# Patient Record
Sex: FEMALE | Race: WHITE | NOT HISPANIC OR LATINO
[De-identification: names, ages, dates, MRNs, and addresses within clinical notes are randomized per-mention and may not be internally consistent; named-entity substitution may affect disease eponyms.]

---

## 2019-08-13 ENCOUNTER — APPOINTMENT (OUTPATIENT)
Dept: ORTHOPEDIC SURGERY | Facility: CLINIC | Age: 44
End: 2019-08-13

## 2020-05-22 ENCOUNTER — INPATIENT (INPATIENT)
Facility: HOSPITAL | Age: 45
LOS: 1 days | Discharge: HOME | DRG: 897 | End: 2020-05-24
Attending: INTERNAL MEDICINE | Admitting: INTERNAL MEDICINE
Payer: COMMERCIAL

## 2020-05-22 VITALS
HEART RATE: 101 BPM | RESPIRATION RATE: 16 BRPM | DIASTOLIC BLOOD PRESSURE: 100 MMHG | WEIGHT: 139.99 LBS | HEIGHT: 64 IN | TEMPERATURE: 98 F | OXYGEN SATURATION: 98 % | SYSTOLIC BLOOD PRESSURE: 155 MMHG

## 2020-05-22 DIAGNOSIS — R55 SYNCOPE AND COLLAPSE: ICD-10-CM

## 2020-05-22 LAB
ALBUMIN SERPL ELPH-MCNC: 4.4 G/DL — SIGNIFICANT CHANGE UP (ref 3.3–5)
ALP SERPL-CCNC: 62 U/L — SIGNIFICANT CHANGE UP (ref 40–120)
ALT FLD-CCNC: 54 U/L — HIGH (ref 10–45)
AMPHET UR-MCNC: NEGATIVE — SIGNIFICANT CHANGE UP
ANION GAP SERPL CALC-SCNC: 20 MMOL/L — HIGH (ref 5–17)
APAP SERPL-MCNC: <15 UG/ML — SIGNIFICANT CHANGE UP (ref 10–30)
APPEARANCE UR: CLEAR — SIGNIFICANT CHANGE UP
AST SERPL-CCNC: 92 U/L — HIGH (ref 10–40)
BACTERIA # UR AUTO: ABNORMAL
BARBITURATES UR SCN-MCNC: NEGATIVE — SIGNIFICANT CHANGE UP
BASOPHILS # BLD AUTO: 0.07 K/UL — SIGNIFICANT CHANGE UP (ref 0–0.2)
BASOPHILS NFR BLD AUTO: 0.8 % — SIGNIFICANT CHANGE UP (ref 0–2)
BENZODIAZ UR-MCNC: NEGATIVE — SIGNIFICANT CHANGE UP
BILIRUB SERPL-MCNC: 1.6 MG/DL — HIGH (ref 0.2–1.2)
BILIRUB UR-MCNC: NEGATIVE — SIGNIFICANT CHANGE UP
BUN SERPL-MCNC: 10 MG/DL — SIGNIFICANT CHANGE UP (ref 7–23)
CALCIUM SERPL-MCNC: 9.1 MG/DL — SIGNIFICANT CHANGE UP (ref 8.4–10.5)
CHLORIDE SERPL-SCNC: 86 MMOL/L — LOW (ref 96–108)
CO2 SERPL-SCNC: 24 MMOL/L — SIGNIFICANT CHANGE UP (ref 22–31)
COCAINE METAB.OTHER UR-MCNC: NEGATIVE — SIGNIFICANT CHANGE UP
COLOR SPEC: YELLOW — SIGNIFICANT CHANGE UP
CREAT SERPL-MCNC: 0.56 MG/DL — SIGNIFICANT CHANGE UP (ref 0.5–1.3)
DIFF PNL FLD: ABNORMAL
EOSINOPHIL # BLD AUTO: 0.01 K/UL — SIGNIFICANT CHANGE UP (ref 0–0.5)
EOSINOPHIL NFR BLD AUTO: 0.1 % — SIGNIFICANT CHANGE UP (ref 0–6)
EPI CELLS # UR: 4 /HPF — SIGNIFICANT CHANGE UP
ETHANOL SERPL-MCNC: SIGNIFICANT CHANGE UP MG/DL (ref 0–10)
GLUCOSE SERPL-MCNC: 188 MG/DL — HIGH (ref 70–99)
GLUCOSE UR QL: NEGATIVE — SIGNIFICANT CHANGE UP
HCT VFR BLD CALC: 33 % — LOW (ref 34.5–45)
HGB BLD-MCNC: 11.2 G/DL — LOW (ref 11.5–15.5)
HYALINE CASTS # UR AUTO: 1 /LPF — SIGNIFICANT CHANGE UP (ref 0–2)
IMM GRANULOCYTES NFR BLD AUTO: 0.7 % — SIGNIFICANT CHANGE UP (ref 0–1.5)
KETONES UR-MCNC: SIGNIFICANT CHANGE UP
LEUKOCYTE ESTERASE UR-ACNC: ABNORMAL
LYMPHOCYTES # BLD AUTO: 1.02 K/UL — SIGNIFICANT CHANGE UP (ref 1–3.3)
LYMPHOCYTES # BLD AUTO: 11.2 % — LOW (ref 13–44)
MAGNESIUM SERPL-MCNC: 1 MG/DL — CRITICAL LOW (ref 1.6–2.6)
MCHC RBC-ENTMCNC: 31.8 PG — SIGNIFICANT CHANGE UP (ref 27–34)
MCHC RBC-ENTMCNC: 33.9 GM/DL — SIGNIFICANT CHANGE UP (ref 32–36)
MCV RBC AUTO: 93.8 FL — SIGNIFICANT CHANGE UP (ref 80–100)
METHADONE UR-MCNC: NEGATIVE — SIGNIFICANT CHANGE UP
MONOCYTES # BLD AUTO: 0.47 K/UL — SIGNIFICANT CHANGE UP (ref 0–0.9)
MONOCYTES NFR BLD AUTO: 5.2 % — SIGNIFICANT CHANGE UP (ref 2–14)
NEUTROPHILS # BLD AUTO: 7.47 K/UL — HIGH (ref 1.8–7.4)
NEUTROPHILS NFR BLD AUTO: 82 % — HIGH (ref 43–77)
NITRITE UR-MCNC: NEGATIVE — SIGNIFICANT CHANGE UP
NRBC # BLD: 0 /100 WBCS — SIGNIFICANT CHANGE UP (ref 0–0)
OPIATES UR-MCNC: NEGATIVE — SIGNIFICANT CHANGE UP
OXYCODONE UR-MCNC: NEGATIVE — SIGNIFICANT CHANGE UP
PCP SPEC-MCNC: SIGNIFICANT CHANGE UP
PCP UR-MCNC: NEGATIVE — SIGNIFICANT CHANGE UP
PH UR: 8 — SIGNIFICANT CHANGE UP (ref 5–8)
PHOSPHATE SERPL-MCNC: 0.9 MG/DL — CRITICAL LOW (ref 2.5–4.5)
PLATELET # BLD AUTO: 180 K/UL — SIGNIFICANT CHANGE UP (ref 150–400)
POTASSIUM SERPL-MCNC: 3.6 MMOL/L — SIGNIFICANT CHANGE UP (ref 3.5–5.3)
POTASSIUM SERPL-SCNC: 3.6 MMOL/L — SIGNIFICANT CHANGE UP (ref 3.5–5.3)
PROT SERPL-MCNC: 7.6 G/DL — SIGNIFICANT CHANGE UP (ref 6–8.3)
PROT UR-MCNC: NEGATIVE — SIGNIFICANT CHANGE UP
RBC # BLD: 3.52 M/UL — LOW (ref 3.8–5.2)
RBC # FLD: 15.8 % — HIGH (ref 10.3–14.5)
RBC CASTS # UR COMP ASSIST: 2 /HPF — SIGNIFICANT CHANGE UP (ref 0–4)
SALICYLATES SERPL-MCNC: <2 MG/DL — LOW (ref 15–30)
SARS-COV-2 RNA SPEC QL NAA+PROBE: SIGNIFICANT CHANGE UP
SODIUM SERPL-SCNC: 130 MMOL/L — LOW (ref 135–145)
SP GR SPEC: 1.01 — LOW (ref 1.01–1.02)
THC UR QL: NEGATIVE — SIGNIFICANT CHANGE UP
UROBILINOGEN FLD QL: ABNORMAL
WBC # BLD: 9.1 K/UL — SIGNIFICANT CHANGE UP (ref 3.8–10.5)
WBC # FLD AUTO: 9.1 K/UL — SIGNIFICANT CHANGE UP (ref 3.8–10.5)
WBC UR QL: 3 /HPF — SIGNIFICANT CHANGE UP (ref 0–5)

## 2020-05-22 PROCEDURE — 70450 CT HEAD/BRAIN W/O DYE: CPT | Mod: 26

## 2020-05-22 PROCEDURE — 99285 EMERGENCY DEPT VISIT HI MDM: CPT

## 2020-05-22 RX ORDER — SODIUM CHLORIDE 9 MG/ML
1000 INJECTION INTRAMUSCULAR; INTRAVENOUS; SUBCUTANEOUS ONCE
Refills: 0 | Status: COMPLETED | OUTPATIENT
Start: 2020-05-22 | End: 2020-05-22

## 2020-05-22 RX ORDER — PANTOPRAZOLE SODIUM 20 MG/1
40 TABLET, DELAYED RELEASE ORAL
Refills: 0 | Status: DISCONTINUED | OUTPATIENT
Start: 2020-05-22 | End: 2020-05-24

## 2020-05-22 RX ORDER — SODIUM CHLORIDE 9 MG/ML
1000 INJECTION INTRAMUSCULAR; INTRAVENOUS; SUBCUTANEOUS
Refills: 0 | Status: DISCONTINUED | OUTPATIENT
Start: 2020-05-22 | End: 2020-05-24

## 2020-05-22 RX ORDER — FOLIC ACID 0.8 MG
1 TABLET ORAL ONCE
Refills: 0 | Status: COMPLETED | OUTPATIENT
Start: 2020-05-22 | End: 2020-05-22

## 2020-05-22 RX ORDER — MAGNESIUM SULFATE 500 MG/ML
1 VIAL (ML) INJECTION ONCE
Refills: 0 | Status: COMPLETED | OUTPATIENT
Start: 2020-05-22 | End: 2020-05-22

## 2020-05-22 RX ORDER — THIAMINE MONONITRATE (VIT B1) 100 MG
100 TABLET ORAL DAILY
Refills: 0 | Status: DISCONTINUED | OUTPATIENT
Start: 2020-05-22 | End: 2020-05-24

## 2020-05-22 RX ORDER — THIAMINE MONONITRATE (VIT B1) 100 MG
100 TABLET ORAL ONCE
Refills: 0 | Status: COMPLETED | OUTPATIENT
Start: 2020-05-22 | End: 2020-05-22

## 2020-05-22 RX ORDER — FOLIC ACID 0.8 MG
1 TABLET ORAL DAILY
Refills: 0 | Status: DISCONTINUED | OUTPATIENT
Start: 2020-05-22 | End: 2020-05-24

## 2020-05-22 RX ADMIN — SODIUM CHLORIDE 1000 MILLILITER(S): 9 INJECTION INTRAMUSCULAR; INTRAVENOUS; SUBCUTANEOUS at 15:15

## 2020-05-22 RX ADMIN — SODIUM CHLORIDE 1000 MILLILITER(S): 9 INJECTION INTRAMUSCULAR; INTRAVENOUS; SUBCUTANEOUS at 18:56

## 2020-05-22 RX ADMIN — Medication 83.33 MILLIMOLE(S): at 17:16

## 2020-05-22 RX ADMIN — Medication 1 GRAM(S): at 17:11

## 2020-05-22 RX ADMIN — Medication 1 TABLET(S): at 14:49

## 2020-05-22 RX ADMIN — Medication 100 GRAM(S): at 15:15

## 2020-05-22 RX ADMIN — Medication 25 MILLIGRAM(S): at 14:49

## 2020-05-22 RX ADMIN — Medication 100 MILLIGRAM(S): at 14:49

## 2020-05-22 RX ADMIN — Medication 1 MILLIGRAM(S): at 17:16

## 2020-05-22 NOTE — ED ADULT NURSE NOTE - CHPI ED NUR SYMPTOMS NEG
no nausea/no vomiting/no dizziness/no chills/no decreased eating/drinking/no fever/no tingling/no pain/no weakness

## 2020-05-22 NOTE — ED ADULT NURSE NOTE - OBJECTIVE STATEMENT
43 yo F w/ PMHx of anxiety, depression, EtOH abuse, HTN presents to ED via EMS c/o syncopal episode. Per EMS they were called to pt residence after mother witnessed pt fall, hitting head, while foaming at the mouth. EMS reports residence was "in disarray with empty alcohol containers all over". Pt mother reportedly also expressed concern to EMS re: pt alcohol intake. Pt initially denied recent alcohol consumption, states was recently in rehab 2/2020, when questioned further pt admits to drinking recently, states last drink was several days ago. Pt states has not had a drink in several days d/t desire to "get better". 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 Pt does not remember events of fall, +LOC, when she woke EMS was present. Pt denies HA, changes in vision, n/v, weakness, dizziness. Extremities strong and equal b/l. PERRL. No obvious signs of physical injury. NSR noted on cardiac monitor. Pt denies any CP, SOB, N/V, fever, chills, urinary complaints, constipation, diarrhea, HA, dizziness, weakness. Pt A&Ox4, lungs CTA, +central pulses. Abdomen soft, not tender, not distended. Ambulating w/ steady gait, safety and comfort maintained, no acute distress noted at this time.

## 2020-05-22 NOTE — H&P ADULT - NSHPPHYSICALEXAM_GEN_ALL_CORE
pt. seen and examined, NAD     Vital Signs Last 24 Hrs  T(C): 36.7 (23 May 2020 04:23), Max: 37.2 (22 May 2020 19:51)  T(F): 98.1 (23 May 2020 04:23), Max: 99 (22 May 2020 20:49)  HR: 80 (23 May 2020 04:23) (78 - 101)  BP: 124/75 (23 May 2020 04:23) (118/80 - 155/100)  BP(mean): 108 (22 May 2020 19:51) (108 - 108)  RR: 18 (23 May 2020 04:23) (16 - 20)  SpO2: 98% (23 May 2020 04:23) (97% - 100%)    heent: nc/at  neck: supple, no JVD  lungs: B/L clear, no w/r/r  heart: s1s2 nml  abd: soft, NABS, NT/ND  ext: no e/c/c, pulses 2+  neuro: aaox3, no focal deficit

## 2020-05-22 NOTE — CONSULT NOTE ADULT - ASSESSMENT
Serina Artis  44F pmhx ETOH dependence s/p seizure like episode found to have small L frontal hypodensity, neurologically intact.  - No acute intervention  - MRI brain wwo  - Keppra 500 mg BID

## 2020-05-22 NOTE — H&P ADULT - PROBLEM SELECTOR PLAN 2
pt. was in rehab back in Feb '2020  - consult  -pt. says she already contacted rehab and likely will check in rehab again after d/c  -CIWA protocol  -IV ativan prn   Thiamine/ folic acid

## 2020-05-22 NOTE — ED PROVIDER NOTE - ATTENDING CONTRIBUTION TO CARE
Attending MD Rdz:  I personally have seen and examined this patient.  Resident note reviewed and agree on plan of care and except where noted.

## 2020-05-22 NOTE — ED ADULT NURSE NOTE - NSIMPLEMENTINTERV_GEN_ALL_ED
Implemented All Fall Risk Interventions:  [place] to call system. Call bell, personal items and telephone within reach. Instruct patient to call for assistance. Room bathroom lighting operational. Non-slip footwear when patient is off stretcher. Physically safe environment: no spills, clutter or unnecessary equipment. Stretcher in lowest position, wheels locked, appropriate side rails in place. Provide visual cue, wrist band, yellow gown, etc. Monitor gait and stability. Monitor for mental status changes and reorient to person, place, and time. Review medications for side effects contributing to fall risk. Reinforce activity limits and safety measures with patient and family.

## 2020-05-22 NOTE — H&P ADULT - PROBLEM SELECTOR PLAN 1
likely from ETOH abuse   -CT head shows frontal lobe hypodensity  -seen by neurosurgery   -advised to start keppra 500 mg bid   -MRI of brain

## 2020-05-22 NOTE — ED PROVIDER NOTE - PROGRESS NOTE DETAILS
Mojgan Silva MD: Low Mg and Phos, will replete. CT head shows L frontal abn suspicious for mass. Pt scheduled for MRI head tomorrow AM. Will admit to medicine.

## 2020-05-22 NOTE — ED PROVIDER NOTE - PHYSICAL EXAMINATION
CONSTITUTIONAL: Nontoxic, well nourished, well developed, middle-aged female, resting comfortably in no acute distress  HEAD: Normocephalic; atraumatic  EYES: Normal inspection, EOMI, 5mm L pupil and 3mm R pupil reactive to light  ENMT: External appears normal; normal oropharynx, tongue fasciculations   NECK: Supple; non-tender; no cervical lymphadenopathy  CARD: RRR; no audible murmurs, rubs, or gallops  RESP: No respiratory distress, lungs ctab/l  ABD: Soft, non-distended; non-tender; no rebound or guarding  EXT: No LE pitting edema or calf tenderness; distal pulses intact with good capillary refill, b/l hand tremors   SKIN: Warm, dry, intact  NEURO: aaox3, CN II-IX intact, 5/5 strength b/l UE and LE, sensation intact in all extremities, no pronator drift, finger to nose intact, no disdiadokinesia

## 2020-05-22 NOTE — ED ADULT NURSE NOTE - CAS EDN DISCHARGE ASSESSMENT
Patient baseline mental status/CIWA completed prior to report/Alert and oriented to person, place and time Alert and oriented to person, place and time/Patient baseline mental status/CIWA completed prior to report. RN informed that at 2020 Called NP team regarding CIWA order as order set placed incorrectly (no PRN meds, high risk/low risk). NP stated that order set will be fixed within the hour. Will continue to do CIWA assessments every 1 hour and will report back to NP if any medications are needed urgently.

## 2020-05-22 NOTE — H&P ADULT - NSHPLABSRESULTS_GEN_ALL_CORE
11.2   9.10  )-----------( 180      ( 22 May 2020 14:18 )             33.0     05-22    130<L>  |  86<L>  |  10  ----------------------------<  188<H>  3.6   |  24  |  0.56    Ca    9.1      22 May 2020 14:18  Phos  0.9     05-22  Mg     1.0     05-22    TPro  7.6  /  Alb  4.4  /  TBili  1.6<H>  /  DBili  x   /  AST  92<H>  /  ALT  54<H>  /  AlkPhos  62  05-22

## 2020-05-22 NOTE — ED PROVIDER NOTE - CLINICAL SUMMARY MEDICAL DECISION MAKING FREE TEXT BOX
Mojgan Silva MD: 43yo F with PMH of depression, anxiety, EtOH use disorder (6 weeks of rehab in February) who presents s/p fall with suspected substance use. No s/s of seizures, but tremulous on exam concerning for EtOH withdrawal vs anxiety. Will get labs, tox, CT head to r/o intracranial bleed Mojgan Silva MD: 43yo F with PMH of depression, anxiety, EtOH use disorder (6 weeks of rehab in February) who presents s/p fall with suspected substance use. No s/s of seizures, but tremulous on exam concerning for EtOH withdrawal vs anxiety. Will get labs, tox, CT head to r/o intracranial bleed    Attending MD Rdz: 43 yo female with PMH for HTN, anxiety/depression, alcohol abuse/misuse, presents with EMS after a witnessed syncopal episode where she fell, hit her head and was foaming at the mouth.  Patient states last alcohol use was 3 days ago.  Denies hx of withdrawal seizures.  Last in rehab in February 2020 per patient.  Exam: A & O x 3, NAD, HEENT WNL and no facial asymmetry; lungs CTAB, heart with reg rhythm without murmur; abdomen soft NTND; extremities with no edema; skin with no rashes, neuro exam non focal with no motor or sensory deficits. DDX syncope vs seizure due to alcohol withdrawal.  Plan: head CT, labs, check mag and phos, give MVI and folate, librium, check head CT.  Likely admission.

## 2020-05-22 NOTE — ED ADULT NURSE REASSESSMENT NOTE - NS ED NURSE REASSESS COMMENT FT1
Report received from AURELIO Puentes. Pt is resting comfortably in bed. Pt A&Ox4. Pt informed of POC and awaiting a bed upstairs. Pt has no complaints at this time. Pt safety maintained.

## 2020-05-22 NOTE — H&P ADULT - HISTORY OF PRESENT ILLNESS
45yo F with PMH of depression, anxiety, EtOH use disorder (6 weeks of rehab in February) who presents s/p fall. As per EMS, pt's mother had not heard from pt for a while and decided to visit her house today. Found house to be in disarray with many empty cans of alcoholic beverage. Pt had witnessed syncope and hit her head on wall and was foaming at the mouth. She states that she had nothing to eat today and was feeling weak. Pt has hx of vasovagal syncope, FHx of vasovagal syncope. Started drinking EtOH after rehab and stopped 3-4 days prior. No seizure like movements, tongue biting, incontinence. No fever, chills, focal weakness, N/V, pain, SI/HI. No seizure hx.

## 2020-05-22 NOTE — CHART NOTE - NSCHARTNOTEFT_GEN_A_CORE
EMERGENCY : LMSW consulted via sunrise for positive SBIRT screen. LMSW met with patient at bedside and introduced self and role to which patient verbalized understanding. Patient alert and oriented x4. Patient with negative BAL at this time. Patient confirmed all demographic information. Patient states that she has a lot of anxiety right now due to being in the hospital because she doesn't like hospitals. LMSW provided active listening and emotional support to patient at this time. Patient states that she has a history of alcohol abuse but that she was six weeks sober prior to the covid-19 outbreak. Patient states that she was doing individual counseling and then transitioned to group counseling at [facility] in [place], [place]. Patient states she was doing well and that they would test her for ETOH use and would always test negative but that since they have transitioned to telehealth that she broke her sobriety. Patient states she drinks 2-3 drinks about every other day. See SBIRT note. Patient still following with [facility] and is declining further resources. Patient stating she just wants to go home because being here makes her very anxious. LMSW provided further supportive counseling to patient to which she responded well. LMSW provided contact information and ensured ongoing SW availability. SW to remain available for any further needs.

## 2020-05-22 NOTE — SBIRT NOTE ADULT - NSSBIRTBRIEFINTDET_GEN_A_CORE
[facility] provided brief intervention to patient about alcohol use. Patient states she attends [facility] outpatient rehab in [place] and was recently transitioned from individual counseling to group counseling. Patient states she has been keeping up with meetings via telephone but has broke her sobriety after being sober for 6 weeks when the covid-19 outbreak happened. Patient feels this was due to the fact that at [facility] they would test her for ETOH but not that they have transitioned to telehealth, they do not test for ETOH. Patient states her plan is to tell her counselor that she broke her sobriety. Patient declining additional resources from [facility] at this time. Contact information provided and social work to remain available for any further needs.

## 2020-05-22 NOTE — ED PROVIDER NOTE - OBJECTIVE STATEMENT
Mojgan Silva MD: 45yo F with PMH of depression, anxiety, EtOH use disorder (6 weeks of rehab in February) who presents s/p fall. As per EMS, pt's mother had not heard from pt for a while and decided to visit her house today. Found house to be in disarray with many empty cans of alcoholic beverage. Pt had witnessed syncope and hit her head on wall and was foaming at the mouth. She states that she had nothing to eat today and was feeling weak. Pt has hx of vasovagal syncope, FHx of vasovagal syncope. Started drinking EtOH after rehab and stopped 3-4 days prior. No seizure like movements, tongue biting, incontinence. No fever, chills, focal weakness, N/V, pain, SI/HI. No seizure hx.

## 2020-05-23 DIAGNOSIS — R55 SYNCOPE AND COLLAPSE: ICD-10-CM

## 2020-05-23 DIAGNOSIS — I10 ESSENTIAL (PRIMARY) HYPERTENSION: ICD-10-CM

## 2020-05-23 DIAGNOSIS — E87.1 HYPO-OSMOLALITY AND HYPONATREMIA: ICD-10-CM

## 2020-05-23 DIAGNOSIS — F10.10 ALCOHOL ABUSE, UNCOMPLICATED: ICD-10-CM

## 2020-05-23 DIAGNOSIS — F32.9 MAJOR DEPRESSIVE DISORDER, SINGLE EPISODE, UNSPECIFIED: ICD-10-CM

## 2020-05-23 LAB
ALBUMIN SERPL ELPH-MCNC: 4.6 G/DL — SIGNIFICANT CHANGE UP (ref 3.3–5)
ALP SERPL-CCNC: 69 U/L — SIGNIFICANT CHANGE UP (ref 40–120)
ALT FLD-CCNC: 52 U/L — HIGH (ref 10–45)
ANION GAP SERPL CALC-SCNC: 17 MMOL/L — SIGNIFICANT CHANGE UP (ref 5–17)
AST SERPL-CCNC: 83 U/L — HIGH (ref 10–40)
BILIRUB SERPL-MCNC: 1.6 MG/DL — HIGH (ref 0.2–1.2)
BUN SERPL-MCNC: <4 MG/DL — LOW (ref 7–23)
CALCIUM SERPL-MCNC: 8.9 MG/DL — SIGNIFICANT CHANGE UP (ref 8.4–10.5)
CHLORIDE SERPL-SCNC: 93 MMOL/L — LOW (ref 96–108)
CO2 SERPL-SCNC: 26 MMOL/L — SIGNIFICANT CHANGE UP (ref 22–31)
CREAT SERPL-MCNC: 0.47 MG/DL — LOW (ref 0.5–1.3)
GLUCOSE SERPL-MCNC: 107 MG/DL — HIGH (ref 70–99)
HCG SERPL-ACNC: <2 MIU/ML — SIGNIFICANT CHANGE UP
HCT VFR BLD CALC: 35.9 % — SIGNIFICANT CHANGE UP (ref 34.5–45)
HGB BLD-MCNC: 12.3 G/DL — SIGNIFICANT CHANGE UP (ref 11.5–15.5)
MAGNESIUM SERPL-MCNC: 1.6 MG/DL — SIGNIFICANT CHANGE UP (ref 1.6–2.6)
MCHC RBC-ENTMCNC: 32.6 PG — SIGNIFICANT CHANGE UP (ref 27–34)
MCHC RBC-ENTMCNC: 34.3 GM/DL — SIGNIFICANT CHANGE UP (ref 32–36)
MCV RBC AUTO: 95.2 FL — SIGNIFICANT CHANGE UP (ref 80–100)
NRBC # BLD: 0 /100 WBCS — SIGNIFICANT CHANGE UP (ref 0–0)
PHOSPHATE SERPL-MCNC: 3.1 MG/DL — SIGNIFICANT CHANGE UP (ref 2.5–4.5)
PLATELET # BLD AUTO: 161 K/UL — SIGNIFICANT CHANGE UP (ref 150–400)
POTASSIUM SERPL-MCNC: 2.8 MMOL/L — CRITICAL LOW (ref 3.5–5.3)
POTASSIUM SERPL-SCNC: 2.8 MMOL/L — CRITICAL LOW (ref 3.5–5.3)
PROT SERPL-MCNC: 7.9 G/DL — SIGNIFICANT CHANGE UP (ref 6–8.3)
RBC # BLD: 3.77 M/UL — LOW (ref 3.8–5.2)
RBC # FLD: 16.1 % — HIGH (ref 10.3–14.5)
SODIUM SERPL-SCNC: 136 MMOL/L — SIGNIFICANT CHANGE UP (ref 135–145)
WBC # BLD: 10.53 K/UL — HIGH (ref 3.8–10.5)
WBC # FLD AUTO: 10.53 K/UL — HIGH (ref 3.8–10.5)

## 2020-05-23 PROCEDURE — 70553 MRI BRAIN STEM W/O & W/DYE: CPT | Mod: 26

## 2020-05-23 RX ORDER — LEVETIRACETAM 250 MG/1
500 TABLET, FILM COATED ORAL
Refills: 0 | Status: DISCONTINUED | OUTPATIENT
Start: 2020-05-23 | End: 2020-05-24

## 2020-05-23 RX ORDER — POTASSIUM CHLORIDE 20 MEQ
40 PACKET (EA) ORAL ONCE
Refills: 0 | Status: COMPLETED | OUTPATIENT
Start: 2020-05-23 | End: 2020-05-23

## 2020-05-23 RX ORDER — LANOLIN ALCOHOL/MO/W.PET/CERES
5 CREAM (GRAM) TOPICAL AT BEDTIME
Refills: 0 | Status: DISCONTINUED | OUTPATIENT
Start: 2020-05-23 | End: 2020-05-24

## 2020-05-23 RX ORDER — POTASSIUM CHLORIDE 20 MEQ
10 PACKET (EA) ORAL
Refills: 0 | Status: COMPLETED | OUTPATIENT
Start: 2020-05-23 | End: 2020-05-23

## 2020-05-23 RX ADMIN — Medication 5 MILLIGRAM(S): at 21:51

## 2020-05-23 RX ADMIN — SODIUM CHLORIDE 75 MILLILITER(S): 9 INJECTION INTRAMUSCULAR; INTRAVENOUS; SUBCUTANEOUS at 21:51

## 2020-05-23 RX ADMIN — Medication 1 MILLIGRAM(S): at 11:10

## 2020-05-23 RX ADMIN — PANTOPRAZOLE SODIUM 40 MILLIGRAM(S): 20 TABLET, DELAYED RELEASE ORAL at 08:42

## 2020-05-23 RX ADMIN — SODIUM CHLORIDE 75 MILLILITER(S): 9 INJECTION INTRAMUSCULAR; INTRAVENOUS; SUBCUTANEOUS at 00:14

## 2020-05-23 RX ADMIN — Medication 100 MILLIGRAM(S): at 11:10

## 2020-05-23 RX ADMIN — LEVETIRACETAM 500 MILLIGRAM(S): 250 TABLET, FILM COATED ORAL at 05:51

## 2020-05-23 RX ADMIN — Medication 40 MILLIEQUIVALENT(S): at 08:42

## 2020-05-23 RX ADMIN — Medication 2 MILLIGRAM(S): at 19:09

## 2020-05-23 RX ADMIN — Medication 100 MILLIEQUIVALENT(S): at 11:08

## 2020-05-23 RX ADMIN — LEVETIRACETAM 500 MILLIGRAM(S): 250 TABLET, FILM COATED ORAL at 18:57

## 2020-05-23 RX ADMIN — Medication 100 MILLIEQUIVALENT(S): at 09:52

## 2020-05-23 RX ADMIN — Medication 100 MILLIEQUIVALENT(S): at 08:43

## 2020-05-23 RX ADMIN — SODIUM CHLORIDE 75 MILLILITER(S): 9 INJECTION INTRAMUSCULAR; INTRAVENOUS; SUBCUTANEOUS at 08:43

## 2020-05-23 NOTE — PROGRESS NOTE ADULT - ASSESSMENT
Serina Artis  44F pmhx ETOH dependence s/p seizure like episode found to have small L frontal hypodensity, neurologically intact.  - No acute intervention  - MRI brain wwo w/ sedation pending  - Keppra 500 mg BID

## 2020-05-23 NOTE — PROGRESS NOTE ADULT - SUBJECTIVE AND OBJECTIVE BOX
p ([id number])     HPI:  Serina Artis  44F pmhx ETOH dependence s/p seizure like episode found to have small L frontal hypodensity, neurologically intact.    Imaging:    Exam:  AOx3, FC, L pupil 3 reactive, R pupil 2 reactive, EOMI, no facial   5/5 throughout, no drift  SILT  no clonus

## 2020-05-23 NOTE — PROGRESS NOTE ADULT - SUBJECTIVE AND OBJECTIVE BOX
Patient is a 44y old  Female who presents with a chief complaint of found on floor (23 May 2020 19:15)    pt. seen and examined, denies any c/o     INTERVAL HPI/OVERNIGHT EVENTS:  T(C): 37.2 (20 @ 21:28), Max: 37.2 (20 @ 21:28)  HR: 100 (20 @ 21:28) (80 - 102)  BP: 134/94 (20 @ 21:28) (118/80 - 135/78)  RR: 18 (20 @ 21:28) (18 - 18)  SpO2: 99% (20 @ 21:28) (97% - 100%)  Wt(kg): --  I&O's Summary      PAST MEDICAL & SURGICAL HISTORY:  ETOH abuse  Anxiety  Depression  HTN (hypertension)  No significant past surgical history      SOCIAL HISTORY  Alcohol:  Tobacco:  Illicit substance use:    FAMILY HISTORY:    REVIEW OF SYSTEMS:  CONSTITUTIONAL: No fever, weight loss, or fatigue  EYES: No eye pain, visual disturbances, or discharge  ENMT:  No difficulty hearing, tinnitus, vertigo; No sinus or throat pain  NECK: No pain or stiffness  RESPIRATORY: No cough, wheezing, chills or hemoptysis; No shortness of breath  CARDIOVASCULAR: No chest pain, palpitations, dizziness, or leg swelling  GASTROINTESTINAL: No abdominal or epigastric pain. No nausea, vomiting, or hematemesis; No diarrhea or constipation. No melena or hematochezia.  GENITOURINARY: No dysuria, frequency, hematuria, or incontinence  NEUROLOGICAL: No headaches, memory loss, loss of strength, numbness, or tremors  SKIN: No itching, burning, rashes, or lesions   LYMPH NODES: No enlarged glands  ENDOCRINE: No heat or cold intolerance; No hair loss  MUSCULOSKELETAL: No joint pain or swelling; No muscle, back, or extremity pain  PSYCHIATRIC: No depression, anxiety, mood swings, or difficulty sleeping  HEME/LYMPH: No easy bruising, or bleeding gums  ALLERY AND IMMUNOLOGIC: No hives or eczema    RADIOLOGY & ADDITIONAL TESTS:    Imaging Personally Reviewed:  [ ] YES  [ ] NO    Consultant(s) Notes Reviewed:  [ ] YES  [ ] NO    PHYSICAL EXAM:  GENERAL: NAD, well-groomed, well-developed  HEAD:  Atraumatic, Normocephalic  EYES: EOMI, PERRLA, conjunctiva and sclera clear  ENMT: No tonsillar erythema, exudates, or enlargement; Moist mucous membranes, Good dentition, No lesions  NECK: Supple, No JVD, Normal thyroid  NERVOUS SYSTEM:  Alert & Oriented X3, Good concentration; Motor Strength 5/5 B/L upper and lower extremities; DTRs 2+ intact and symmetric  CHEST/LUNG: Clear to percussion bilaterally; No rales, rhonchi, wheezing, or rubs  HEART: Regular rate and rhythm; No murmurs, rubs, or gallops  ABDOMEN: Soft, Nontender, Nondistended; Bowel sounds present  EXTREMITIES:  2+ Peripheral Pulses, No clubbing, cyanosis, or edema  LYMPH: No lymphadenopathy noted  SKIN: No rashes or lesions    LABS:                        12.3   10.53 )-----------( 161      ( 23 May 2020 07:10 )             35.9     05-    136  |  93<L>  |  <4<L>  ----------------------------<  107<H>  2.8<LL>   |  26  |  0.47<L>    Ca    8.9      23 May 2020 07:10  Phos  3.1       Mg     1.6         TPro  7.9  /  Alb  4.6  /  TBili  1.6<H>  /  DBili  x   /  AST  83<H>  /  ALT  52<H>  /  AlkPhos  69        Urinalysis Basic - ( 22 May 2020 17:30 )    Color: Yellow / Appearance: Clear / S.008 / pH: x  Gluc: x / Ketone: Trace  / Bili: Negative / Urobili: 3 mg/dL   Blood: x / Protein: Negative / Nitrite: Negative   Leuk Esterase: Moderate / RBC: 2 /hpf / WBC 3 /HPF   Sq Epi: x / Non Sq Epi: 4 /hpf / Bacteria: Few      CAPILLARY BLOOD GLUCOSE            Urinalysis Basic - ( 22 May 2020 17:30 )    Color: Yellow / Appearance: Clear / S.008 / pH: x  Gluc: x / Ketone: Trace  / Bili: Negative / Urobili: 3 mg/dL   Blood: x / Protein: Negative / Nitrite: Negative   Leuk Esterase: Moderate / RBC: 2 /hpf / WBC 3 /HPF   Sq Epi: x / Non Sq Epi: 4 /hpf / Bacteria: Few        MEDICATIONS  (STANDING):  folic acid 1 milliGRAM(s) Oral daily  levETIRAcetam 500 milliGRAM(s) Oral two times a day  melatonin 5 milliGRAM(s) Oral at bedtime  pantoprazole    Tablet 40 milliGRAM(s) Oral before breakfast  sodium chloride 0.9%. 1000 milliLiter(s) (75 mL/Hr) IV Continuous <Continuous>  thiamine 100 milliGRAM(s) Oral daily    MEDICATIONS  (PRN):  LORazepam   Injectable 1 milliGRAM(s) IV Push every 4 hours PRN Agitation      Care Discussed with Consultants/Other Providers [ ] YES  [ ] NO

## 2020-05-24 ENCOUNTER — TRANSCRIPTION ENCOUNTER (OUTPATIENT)
Age: 45
End: 2020-05-24

## 2020-05-24 VITALS
SYSTOLIC BLOOD PRESSURE: 134 MMHG | RESPIRATION RATE: 18 BRPM | TEMPERATURE: 98 F | HEART RATE: 104 BPM | OXYGEN SATURATION: 95 % | DIASTOLIC BLOOD PRESSURE: 84 MMHG

## 2020-05-24 LAB
ANION GAP SERPL CALC-SCNC: 13 MMOL/L — SIGNIFICANT CHANGE UP (ref 5–17)
BUN SERPL-MCNC: 4 MG/DL — LOW (ref 7–23)
CALCIUM SERPL-MCNC: 8.7 MG/DL — SIGNIFICANT CHANGE UP (ref 8.4–10.5)
CHLORIDE SERPL-SCNC: 102 MMOL/L — SIGNIFICANT CHANGE UP (ref 96–108)
CO2 SERPL-SCNC: 24 MMOL/L — SIGNIFICANT CHANGE UP (ref 22–31)
CREAT SERPL-MCNC: 0.51 MG/DL — SIGNIFICANT CHANGE UP (ref 0.5–1.3)
GLUCOSE SERPL-MCNC: 120 MG/DL — HIGH (ref 70–99)
HCT VFR BLD CALC: 30.8 % — LOW (ref 34.5–45)
HGB BLD-MCNC: 10 G/DL — LOW (ref 11.5–15.5)
MCHC RBC-ENTMCNC: 32.3 PG — SIGNIFICANT CHANGE UP (ref 27–34)
MCHC RBC-ENTMCNC: 32.5 GM/DL — SIGNIFICANT CHANGE UP (ref 32–36)
MCV RBC AUTO: 99.4 FL — SIGNIFICANT CHANGE UP (ref 80–100)
NRBC # BLD: 0 /100 WBCS — SIGNIFICANT CHANGE UP (ref 0–0)
PLATELET # BLD AUTO: 124 K/UL — LOW (ref 150–400)
POTASSIUM SERPL-MCNC: 3.4 MMOL/L — LOW (ref 3.5–5.3)
POTASSIUM SERPL-SCNC: 3.4 MMOL/L — LOW (ref 3.5–5.3)
RBC # BLD: 3.1 M/UL — LOW (ref 3.8–5.2)
RBC # FLD: 16.2 % — HIGH (ref 10.3–14.5)
SODIUM SERPL-SCNC: 139 MMOL/L — SIGNIFICANT CHANGE UP (ref 135–145)
WBC # BLD: 6.91 K/UL — SIGNIFICANT CHANGE UP (ref 3.8–10.5)
WBC # FLD AUTO: 6.91 K/UL — SIGNIFICANT CHANGE UP (ref 3.8–10.5)

## 2020-05-24 PROCEDURE — 70553 MRI BRAIN STEM W/O & W/DYE: CPT

## 2020-05-24 PROCEDURE — 80307 DRUG TEST PRSMV CHEM ANLYZR: CPT

## 2020-05-24 PROCEDURE — 96365 THER/PROPH/DIAG IV INF INIT: CPT | Mod: XU

## 2020-05-24 PROCEDURE — 93005 ELECTROCARDIOGRAM TRACING: CPT

## 2020-05-24 PROCEDURE — 99285 EMERGENCY DEPT VISIT HI MDM: CPT | Mod: 25

## 2020-05-24 PROCEDURE — 85027 COMPLETE CBC AUTOMATED: CPT

## 2020-05-24 PROCEDURE — 70450 CT HEAD/BRAIN W/O DYE: CPT

## 2020-05-24 PROCEDURE — 84702 CHORIONIC GONADOTROPIN TEST: CPT

## 2020-05-24 PROCEDURE — 80048 BASIC METABOLIC PNL TOTAL CA: CPT

## 2020-05-24 PROCEDURE — 96367 TX/PROPH/DG ADDL SEQ IV INF: CPT

## 2020-05-24 PROCEDURE — 80053 COMPREHEN METABOLIC PANEL: CPT

## 2020-05-24 PROCEDURE — 84100 ASSAY OF PHOSPHORUS: CPT

## 2020-05-24 PROCEDURE — 83735 ASSAY OF MAGNESIUM: CPT

## 2020-05-24 PROCEDURE — A9585: CPT

## 2020-05-24 PROCEDURE — 81001 URINALYSIS AUTO W/SCOPE: CPT

## 2020-05-24 RX ORDER — FOLIC ACID 0.8 MG
1 TABLET ORAL
Qty: 0 | Refills: 0 | DISCHARGE
Start: 2020-05-24

## 2020-05-24 RX ORDER — THIAMINE MONONITRATE (VIT B1) 100 MG
1 TABLET ORAL
Qty: 0 | Refills: 0 | DISCHARGE
Start: 2020-05-24

## 2020-05-24 RX ORDER — POTASSIUM CHLORIDE 20 MEQ
40 PACKET (EA) ORAL ONCE
Refills: 0 | Status: COMPLETED | OUTPATIENT
Start: 2020-05-24 | End: 2020-05-24

## 2020-05-24 RX ORDER — LEVETIRACETAM 250 MG/1
1 TABLET, FILM COATED ORAL
Qty: 28 | Refills: 0
Start: 2020-05-24 | End: 2020-06-06

## 2020-05-24 RX ADMIN — LEVETIRACETAM 500 MILLIGRAM(S): 250 TABLET, FILM COATED ORAL at 17:13

## 2020-05-24 RX ADMIN — Medication 40 MILLIEQUIVALENT(S): at 08:10

## 2020-05-24 RX ADMIN — SODIUM CHLORIDE 75 MILLILITER(S): 9 INJECTION INTRAMUSCULAR; INTRAVENOUS; SUBCUTANEOUS at 08:12

## 2020-05-24 RX ADMIN — Medication 1 MILLIGRAM(S): at 15:27

## 2020-05-24 RX ADMIN — Medication 1 MILLIGRAM(S): at 12:16

## 2020-05-24 RX ADMIN — LEVETIRACETAM 500 MILLIGRAM(S): 250 TABLET, FILM COATED ORAL at 05:56

## 2020-05-24 RX ADMIN — PANTOPRAZOLE SODIUM 40 MILLIGRAM(S): 20 TABLET, DELAYED RELEASE ORAL at 06:10

## 2020-05-24 RX ADMIN — Medication 100 MILLIGRAM(S): at 12:16

## 2020-05-24 NOTE — DISCHARGE NOTE NURSING/CASE MANAGEMENT/SOCIAL WORK - PATIENT PORTAL LINK FT
You can access the FollowMyHealth Patient Portal offered by [facility] by registering at the following website: [email or web address]. By joining [employer]’s FollowMyHealth portal, you will also be able to view your health information using other applications (apps) compatible with our system.

## 2020-05-24 NOTE — DISCHARGE NOTE PROVIDER - CARE PROVIDER_API CALL
Catherine Mark NEUROSURGERY - GENERAL  [address]  [place] [address]  Phone: [phone]  Fax: [phone]  Follow Up Time:     Regis Arriaga  Phone: [phone]  Fax: (   )    -  Follow Up Time: Catherine Mark NEUROSURGERY - GENERAL  [address]  [place] [address]  Phone: [phone]  Fax: [phone]  Follow Up Time:     Regis Arriaga  Phone: [phone]  Fax: (   )    -  Follow Up Time:     [place], Neuroscience/Epilepsy  [address] [place] [address]  Phone: [phone]  Fax: (   )    -  Follow Up Time:

## 2020-05-24 NOTE — DISCHARGE NOTE PROVIDER - NSDCMRMEDTOKEN_GEN_ALL_CORE_FT
folic acid 1 mg oral tablet: 1 tab(s) orally once a day  levETIRAcetam 500 mg oral tablet: 1 tab(s) orally 2 times a day  thiamine 100 mg oral tablet: 1 tab(s) orally once a day

## 2020-05-24 NOTE — CHART NOTE - NSCHARTNOTEFT_GEN_A_CORE
MRI brain done, nonenhancing atypical L frontal lesion, read as dilated perivascular spaces vs. low grade neoplasm vs. chronic contusion although unlikely without hemosiderin staining. Patient has history of multiple episodes of vasovagal syncope, unlikely this is related to L frontal lesion.    D/C on 2 weeks keppra, outpatient f/u with Dr. Mark

## 2020-05-24 NOTE — DISCHARGE NOTE PROVIDER - PROVIDER TOKENS
PROVIDER:[TOKEN:[[id number]]],FREE:[LAST:[Corina],FIRST:[Regis],PHONE:[phone]],FAX:[(   )    -]] PROVIDER:[TOKEN:[[id number]]],FREE:[LAST:[Corina],FIRST:[Regis],PHONE:[phone]],FAX:[(   )    -]],FREE:[LAST:[[place]],FIRST:[Neuroscience/Epilepsy],PHONE:[phone]],FAX:[(   )    -],ADDRESS:[[address] [place] [address]]

## 2020-05-24 NOTE — DISCHARGE NOTE PROVIDER - HOSPITAL COURSE
44 year old female admitted for syncopal episode.  CT head shows frontal lobe hypodensity.  Evaluated by neurosurgery who advised to start keppra 500 mg bid.  MRI of brain nonenhancing atypical L frontal lesion, read as dilated perivascular spaces vs. low grade neoplasm vs. chronic contusion although unlikely without hemosiderin staining.  She is to follow up with Neurosurgeon Dr. Mark within two week of discharge.  To be discharged home on Keppra BID.  Medically cleared for discharge.  Refrain from alcohol use. 44 year old female admitted for syncopal episode.  CT head shows frontal lobe hypodensity.  Evaluated by neurosurgery who advised to start keppra 500 mg bid.  MRI of brain nonenhancing atypical L frontal lesion, read as dilated perivascular spaces vs. low grade neoplasm vs. chronic contusion although unlikely without hemosiderin staining.  She is to follow up with Neurosurgeon Dr. Mark within two week of discharge.  To be discharged home on Keppra BID.  Assessed by Neurology team who recommended outpatient follow up for EEG.  Medically cleared for discharge.  Refrain from alcohol use.

## 2020-05-24 NOTE — CONSULT NOTE ADULT - ASSESSMENT
INCOMPLETE  Assessment:  44y R-handed F with h/o ETOH abuse, anxiety, depression, HTN and vasovagal syncope who p/w episode of convulsion and LOC likely due to vasovagal convulsive syncope.   On exam, she has mild low amplitude high frequency tremor of both hands and a lateral tongue bite (which she says is chronic) due to dental procedure, she is otherwise neurologically in tact.      [place] [facility] [place] and MRI head finding of possible L. frontal contusion.  MRI demonstrated T2 flair asymmetric hyperintensity, but no enhancement on post contrast.  Unclear etiology but ddx include old stroke causing focal scarring.  PT was started on Keppra, no events since admission.  While she is at higher risk of seizures, given L. frontal lobe abnormality and EtOH abuse, episode by history is consistent with vasovagal convulsive syncope (pre-syncopal light-headedness, generalized shaking as going down, no post-ictal confusion or incontinence).     Plan  -Continue Keppra for now  -Can follow up with outpatient neurology epilepsy team at [address] [place] [address] to further evaluate need for EEG and/or anti-epileptics.   -No further inpatient neurologic work up.     Assessment and plan discussed with the attending, Dr. Desouza.     Asher Sanchez, DO  PGY-2 Neurology Service Assessment:  44y R-handed F with h/o ETOH abuse, anxiety, depression, HTN and vasovagal syncope who p/w episode of convulsion and LOC likely due to vasovagal convulsive syncope.   On exam, she has mild low amplitude high frequency tremor of both hands and a lateral tongue bite (which she says is chronic) due to dental procedure, she is otherwise neurologically in tact.      PT evaluated by [facility] for [place] head and MRI head finding of possible L. frontal contusion.  MRI demonstrated T2 flair asymmetric hyperintensity, but no enhancement on post contrast.  Unclear etiology but ddx include old stroke causing focal scarring.  PT was started on Keppra, no events since admission.  While she is at higher risk of seizures, given L. frontal lobe abnormality and EtOH abuse, episode by history is consistent with vasovagal convulsive syncope (pre-syncopal light-headedness, generalized shaking as going down, no post-ictal confusion or incontinence).     Plan  -Continue Keppra for now  -Can follow up with outpatient neurology epilepsy team at [address] [place] [address] to further evaluate need for EEG and/or anti-epileptics.   -Consider outpatient MRI brain w/wo contrast in 3 months to re-evaluate L. frontal lesion.   -No further inpatient neurologic work up.     Assessment and plan discussed with the attending, Dr. Desouza.     Asher Sanchez, DO  PGY-2 Neurology Service

## 2020-05-24 NOTE — DISCHARGE NOTE PROVIDER - NSDCCPCAREPLAN_GEN_ALL_CORE_FT
PRINCIPAL DISCHARGE DIAGNOSIS  Diagnosis: Syncope  Assessment and Plan of Treatment: CT head shows frontal lobe hypodensity.  MRI with dilated perivascular spaces vs. low grade neoplasm vs. chronic contusion although unlikely without hemosiderin staining.  Take keppra 500 mg bid as prescribed.  Follow up with Dr. Catherine Mark withing 2 weeks of discharge.  Please call your office to schedule your appointment.        SECONDARY DISCHARGE DIAGNOSES  Diagnosis: HTN (hypertension)  Assessment and Plan of Treatment: Blood pressure controlled without use of hypertensives.  Please follow up with your PCP regarding need of medication. PRINCIPAL DISCHARGE DIAGNOSIS  Diagnosis: Syncope  Assessment and Plan of Treatment: CT head shows frontal lobe hypodensity.  MRI with dilated perivascular spaces vs. low grade neoplasm vs. chronic contusion although unlikely without hemosiderin staining.  Take keppra 500 mg bid as prescribed.  Follow up with Dr. Catherine Mark withing 2 weeks of discharge.  Please call your office to schedule your appointment.  Follow up with Neurology (first MD you can obtain appointment with) for outpatient EEG.  Please call their office to schedule your appointment.        SECONDARY DISCHARGE DIAGNOSES  Diagnosis: HTN (hypertension)  Assessment and Plan of Treatment: Blood pressure controlled without use of hypertensives.  Please follow up with your PCP regarding need of medication.

## 2020-05-24 NOTE — CONSULT NOTE ADULT - SUBJECTIVE AND OBJECTIVE BOX
p (1480)     HPI:  Serina Artis  44F pmhx ETOH dependence s/p seizure like episode found to have small L frontal hypodensity, neurologically intact.    Imaging:    Exam:  AOx3, FC, L pupil 3 reactive, R pupil 2 reactive, EOMI, no facial   5/5 throughout, no drift  SILT  no clonus    --Anticoagulation:    =====================  PAST MEDICAL HISTORY   ETOH abuse  Anxiety  Depression  HTN (hypertension)    PAST SURGICAL HISTORY   No significant past surgical history        MEDICATIONS:  Antibiotics:    Neuro:    Other:      SOCIAL HISTORY:   Occupation:   Marital Status:     FAMILY HISTORY:      ROS: Negative except per HPI    LABS:                          11.2   9.10  )-----------( 180      ( 22 May 2020 14:18 )             33.0     05-22    130<L>  |  86<L>  |  10  ----------------------------<  188<H>  3.6   |  24  |  0.56    Ca    9.1      22 May 2020 14:18  Phos  0.9     05-22  Mg     1.0     05-22    TPro  7.6  /  Alb  4.4  /  TBili  1.6<H>  /  DBili  x   /  AST  92<H>  /  ALT  54<H>  /  AlkPhos  62  05-22
NEUROLOGY CONSULT   HPI: YVONNE BANERJEE is a 44y R-handed woman with a PMH of EtOH abuse, vasovagal syncope, anxiety, depression and HTN who presented on 05-22-20 with episode of generalized convulsions while going down to ground.  PT has had ongoing vasovagal syncope for quite some time.  She normally has a pre-syncopal feeling and dizziness before LOC.  3 days PTA she was getting up from chair, had same pre-syncopal feeling as well as anxiety, subsequently had sudden symmetric B/L convulsions as she went down to ground.  She did hit posterior portion of her head, and mother reports she had bloody foaming of the mouth but no urinary or fecal incontinence.  She was confused for about 2 minutes but then returned to baseline.  Denies prior such episodes or seizure history in the family.  PT had previously significantly abused EtOH, but now drinks about 4-5 spritzer cans per day.  Last drinks was 3 days prior to event.  CT head and subsequent MRI brain demonstrated atypical L. frontal lesion contusion vs. scaring from old stroke vs. non-enhancing mass.  While PT denied any tongue bite, she had recently had multiple drainages of abscess in mouth on right side, which led her to bite her tongue inadvertently frequently.     ROS: A 10-system ROS was performed and is negative except for those items noted above.     PMH:  ETOH abuse  Anxiety  Depression  HTN (hypertension)    Home Medications:  folic acid 1 mg oral tablet: 1 tab(s) orally once a day (24 May 2020 13:10)  thiamine 100 mg oral tablet: 1 tab(s) orally once a day (24 May 2020 13:10)    MEDICATIONS  (STANDING):  folic acid 1 milliGRAM(s) Oral daily  levETIRAcetam 500 milliGRAM(s) Oral two times a day  melatonin 5 milliGRAM(s) Oral at bedtime  pantoprazole    Tablet 40 milliGRAM(s) Oral before breakfast  sodium chloride 0.9%. 1000 milliLiter(s) (75 mL/Hr) IV Continuous <Continuous>  thiamine 100 milliGRAM(s) Oral daily    MEDICATIONS  (PRN):  LORazepam   Injectable 1 milliGRAM(s) IV Push every 4 hours PRN Agitation      ALLERGIES: No Known Allergies    PSH: r. mouth abscess drainage.     Social History: EtOH abuse (5 can spritzer daily, last drink 6 days ago).  Denies tobacco or drug use.       FH: Mother with vasovagal syncope.     OBJECTIVE  Vital Signs Last 24 Hrs  T(C): 36.7 (24 May 2020 12:19), Max: 36.9 (24 May 2020 05:33)  T(F): 98 (24 May 2020 12:19), Max: 98.4 (24 May 2020 05:33)  HR: 104 (24 May 2020 12:19) (82 - 104)  BP: 134/84 (24 May 2020 12:19) (110/74 - 134/84)  BP(mean): --  RR: 18 (24 May 2020 12:19) (18 - 18)  SpO2: 95% (24 May 2020 12:19) (95% - 98%)  I&O's Summary    23 May 2020 07:01  -  24 May 2020 07:00  --------------------------------------------------------  IN: 525 mL / OUT: 0 mL / NET: 525 mL    PHYSICAL EXAM:  General: Appears older than stated age, in NAD  Neurologic:  Mental Status: Awake, alert, oriented to person, place, situation, time. Normal affect. Follows all commands.  Language: Speech is clear, fluent with preserved naming, repetition and comprehension.    Cranial Nerves: PERRLA (R = 3mm, L = 3mm). Visual fields intact. EOMI no nystagmus. V1-3 intact to light touch.  No facial asymmetry b/l, full eye closure strength b/l. Hearing grossly normal to conversation.  Symmetric palate elevation in midline.  Head turning & shoulder shrug intact b/l. Tongue midline, mildly tremulous, R. lateral tongue bite near site of drained abscesses (chronic per patient)   Motor: Normal muscle bulk & tone. Low amplitude high frequency tremor of both hands R>L worse with position change, myoclonus or pronator drift. 5/5 strength throughout	  Sensation: Symmetric B/L preserved sensation to light touch, pin prick, position.    Cortical: No extinction on double simultaneous touch and no signs of neglect.   Reflexes: 2/4 throughout.  Plantar Responses are downgoing.    Coordination: Intact rapid-alternating movements. No dysmetria on finger-to-nose or heel-to-shin.  No dysdiadochokinesia  Gait: Normal stance, stride length, touch off, arm swing and jazzy.   Normal Romberg. No postural instability.   Psychiatric: Mildly anxious mood and congruent affect.     CBC:                        10.0   6.91  )-----------( 124      ( 24 May 2020 07:05 )             30.8   CMP:  05-24    139  |  102  |  4<L>  ----------------------------<  120<H>  3.4<L>   |  24  |  0.51    Ca    8.7      24 May 2020 07:05  Phos  3.1     05-23  Mg     1.6     05-23    TPro  7.9  /  Alb  4.6  /  TBili  1.6<H>  /  DBili  x   /  AST  83<H>  /  ALT  52<H>  /  AlkPhos  69  05-23 05/22/020 CT Head No Cont: Volume loss, microvascular disease, with lobulated 2.6 x 1 x 0.9 cm AP, TR, CC foci of decreased attenuation, left frontal lobe, suggest MRI for improved assessment, no large acute hemorrhage or midline shift. If symptoms persist consider follow-up head CT or MR if no contraindications    05/23/20 MR Head w/wo IV Cont: Focal nonenhancing area of abnormal signal within the left frontal lobe demonstrating predominantly CSF signal with some areas of increased FLAIR signal intensity. While this may represent a chronic cerebral contusion, lack of significant volume loss or hemosiderin staining is atypical. Clustered dilated perivascular spaces is a possibility however a low-grade neoplasm is not entirely excluded. Continued follow-up is advised.

## 2020-08-05 ENCOUNTER — EMERGENCY (EMERGENCY)
Facility: HOSPITAL | Age: 45
LOS: 1 days | Discharge: LEFT BEFORE DISCHARGE | End: 2020-08-05
Attending: EMERGENCY MEDICINE | Admitting: EMERGENCY MEDICINE
Payer: COMMERCIAL

## 2020-08-05 VITALS
HEART RATE: 99 BPM | SYSTOLIC BLOOD PRESSURE: 79 MMHG | OXYGEN SATURATION: 100 % | RESPIRATION RATE: 16 BRPM | TEMPERATURE: 99 F | DIASTOLIC BLOOD PRESSURE: 54 MMHG

## 2020-08-05 VITALS
DIASTOLIC BLOOD PRESSURE: 54 MMHG | HEIGHT: 64 IN | HEART RATE: 102 BPM | WEIGHT: 141.98 LBS | TEMPERATURE: 99 F | OXYGEN SATURATION: 100 % | RESPIRATION RATE: 16 BRPM | SYSTOLIC BLOOD PRESSURE: 87 MMHG

## 2020-08-05 PROBLEM — I10 ESSENTIAL (PRIMARY) HYPERTENSION: Chronic | Status: ACTIVE | Noted: 2020-05-22

## 2020-08-05 PROBLEM — F10.10 ALCOHOL ABUSE, UNCOMPLICATED: Chronic | Status: ACTIVE | Noted: 2020-05-22

## 2020-08-05 PROBLEM — F41.9 ANXIETY DISORDER, UNSPECIFIED: Chronic | Status: ACTIVE | Noted: 2020-05-22

## 2020-08-05 PROBLEM — F32.9 MAJOR DEPRESSIVE DISORDER, SINGLE EPISODE, UNSPECIFIED: Chronic | Status: ACTIVE | Noted: 2020-05-22

## 2020-08-05 PROCEDURE — 99283 EMERGENCY DEPT VISIT LOW MDM: CPT

## 2020-08-05 RX ORDER — SODIUM CHLORIDE 9 MG/ML
3 INJECTION INTRAMUSCULAR; INTRAVENOUS; SUBCUTANEOUS ONCE
Refills: 0 | Status: DISCONTINUED | OUTPATIENT
Start: 2020-08-05 | End: 2020-08-09

## 2020-08-05 RX ORDER — SODIUM CHLORIDE 9 MG/ML
1000 INJECTION INTRAMUSCULAR; INTRAVENOUS; SUBCUTANEOUS ONCE
Refills: 0 | Status: DISCONTINUED | OUTPATIENT
Start: 2020-08-05 | End: 2020-08-09

## 2020-08-05 NOTE — ED PROVIDER NOTE - PATIENT PORTAL LINK FT
You can access the FollowMyHealth Patient Portal offered by [facility] by registering at the following website: [email or web address]. By joining [facility]’s FollowMyHealth portal, you will also be able to view your health information using other applications (apps) compatible with our system.

## 2020-08-05 NOTE — ED PROVIDER NOTE - PHYSICAL EXAMINATION
no midline C/T/L TTP  bilateral abrasions noted to knees no kei TTP  NVI to UE and LE bilaterally no midline C/T/L TTP  bilateral abrasions noted to knees no kei TTP  NVI to UE and LE bilaterally  no tongue bite noted

## 2020-08-05 NOTE — ED PROVIDER NOTE - CARE PROVIDER_API CALL
Jeovany Davalos  CARDIOVASCULAR DISEASE  [address]  [place] [address]  Phone: [phone]  Fax: [phone]  Follow Up Time:

## 2020-08-05 NOTE — ED PROVIDER NOTE - PROGRESS NOTE DETAILS
pt awake, AAOx3 refusing IV, IVF, EKG, blood work. Aware that her BP is low and her HR is elevated.  Offered to defer testing and just hydrate pt with IVF and observe. Pt refusing.  Family outside to pick pt up. States that she can just drink water.  Aware that based on vital signs she is at risk for worsening condition and possible death, still refusing.  Pt has h/o alcohol abuse but is clinically sober and does not want to discuss alcohol use.  Denies SI/HI. Will leave AMA Had an at length discussion with patient.  Patient wishes to leave at this time.  The patient understands that they are leaving against medical advice despite the risk of missing a potentially serious diagnosis which may lead to injury, disability and/or death.  I discussed with the patient which tests would need to be performed and what type of monitoring would be necessary for the patient as well.  I was unable to convince patient to stay for further workup.  The patient was given an opportunity to ask any questions as well.   The patient demonstrates understanding of all risks, is awake and alert and demonstrates competance to make medical decisions.  The patient understands that they may return at any time if desired. Discussed the importance of close, prompt medical follow-up.

## 2020-08-05 NOTE — ED ADULT NURSE NOTE - CHPI ED NUR SYMPTOMS NEG
no syncope/no congestion/no dizziness/no shortness of breath/no vomiting/no chills/no diaphoresis/no fever/no nausea/no chest pain/no back pain

## 2020-08-05 NOTE — ED PROVIDER NOTE - GASTROINTESTINAL, MLM
Abdomen soft, non-tender, no guarding. Abdomen soft, non-tender, no guarding. No pain on exam at this time

## 2020-08-05 NOTE — ED PROVIDER NOTE - OBJECTIVE STATEMENT
43 y/o female with PMHx of ETOH abuse, anxiety, depression and HTN presents to the ED w what she reports as a Vasovagal syncopal episode. Reports she has prior Hx of this in rxn to heat. Was recently admitted to [facility] at end of May for similar episode. Pt underwent full workup and was d/c with no acute findings. Reports she awoke today and didn't have anything to eat or drink. Pt then went out to pool and was out in heat for several hours. Began to feel lightheaded and knew she was going to pass out. She walked to restroom with daughter and was able to make it into stall when she became weak, legs gave out and collapsed onto ground. Had a small loose, nonbloody BM. Pt reports she told daughter to get  for help. Denies LOC during this episode. EMS called and pt taken to ER. Reports she feels significantly better. Knows this was related to heat and refusing further workup. Denies HA, CP, n/v, abd pain at this time. denies numbness or tingling in extremities. Pt denied full workup and will leave AMA. 45 y/o female with PMHx of ETOH abuse, anxiety, depression and HTN presents to the ED w what she reports as a Vasovagal syncopal episode. Reports she has prior Hx of this in rxn to heat. Was recently admitted to [facility] at end of May for similar episode. Pt underwent full workup and was d/c with no acute findings. Reports she awoke today and didn't have anything to eat or drink. Pt then went out to pool and was out in heat for several hours. Began to feel lightheaded and knew she was going to pass out. She walked to restroom with daughter and was able to make it into stall when she became weak, legs gave out and collapsed onto ground. Had a small loose, nonbloody BM. Pt reports she told daughter to get  for help. Denies LOC during this episode. EMS called and pt taken to ER. Reports she feels significantly better. Knows this was related to heat and refusing further workup. Denies HA, CP, SOB n/v, abd pain at this time. denies numbness or tingling in extremities. Pt denied full workup and will leave AMA.
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
## 2020-08-05 NOTE — ED ADULT TRIAGE NOTE - NSTRIAGECARE_GEN_A_ER
Patient informed, verbally understood.
Per JESSICA rules, I can't RF any earlier and I can't change pharmacies  Will have to wait until Thursday, unfortunately.
Pt called stating [facility] will not fill the oxycodone till Thursday,6/13/19, because the pharmacy states it is too soon to . I spoke to Miriam robertson, she states the 30 days goes by the  date, not by the order date. Pt had picked up his last script on 5/13/19, so the pharmacy is telling pt he is unable to get this till Thursday. Pt is asking for his script to be cancelled at [facility] and sent to [facility] [address]. Please advise.
Face Mask

## 2020-08-05 NOTE — ED ADULT NURSE REASSESSMENT NOTE - NS ED NURSE REASSESS COMMENT FT1
Pt refuses blood work, EKG, and IV , states she has a vasovagal episodes and had all the work up done  at [facility] and nothing came of it after all the tests that was done.

## 2020-08-05 NOTE — ED PROVIDER NOTE - NSFOLLOWUPINSTRUCTIONS_ED_ALL_ED_FT
Please feel free to return to the ED for any new or worsening symptoms  Take your medication as prescribed  Make sure to not skip meals and to remain hydrated  Advance activity as tolerated   Establish care with a cardiologist call tomorrow
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
## 2020-09-09 ENCOUNTER — EMERGENCY (EMERGENCY)
Facility: HOSPITAL | Age: 45
LOS: 1 days | Discharge: HOME | End: 2020-09-09
Attending: EMERGENCY MEDICINE
Payer: COMMERCIAL

## 2020-09-09 VITALS
DIASTOLIC BLOOD PRESSURE: 89 MMHG | HEART RATE: 104 BPM | OXYGEN SATURATION: 99 % | SYSTOLIC BLOOD PRESSURE: 116 MMHG | TEMPERATURE: 98 F | RESPIRATION RATE: 16 BRPM

## 2020-09-09 VITALS
DIASTOLIC BLOOD PRESSURE: 92 MMHG | OXYGEN SATURATION: 98 % | WEIGHT: 130.07 LBS | HEART RATE: 82 BPM | TEMPERATURE: 98 F | SYSTOLIC BLOOD PRESSURE: 143 MMHG | RESPIRATION RATE: 16 BRPM | HEIGHT: 64 IN

## 2020-09-09 PROCEDURE — 93005 ELECTROCARDIOGRAM TRACING: CPT

## 2020-09-09 PROCEDURE — 93010 ELECTROCARDIOGRAM REPORT: CPT

## 2020-09-09 PROCEDURE — 99283 EMERGENCY DEPT VISIT LOW MDM: CPT

## 2020-09-09 PROCEDURE — 99284 EMERGENCY DEPT VISIT MOD MDM: CPT

## 2020-09-09 NOTE — ED PROVIDER NOTE - OBJECTIVE STATEMENT
45 y/o female hx HTN presents to the ED from  for near syncope. patient endorsing significant increase in anxiety as an  due to COVID. states she has had stress about the upcoming year and where she will be placed. Got a call today that she had to have COVID swab done asap or she could "get in trouble". went to , became worried about the conversation with the principal, began hyperventilating and then nearly synopsized. states that her BP dropped so the facility called EMS. patient with no si/hi. no prior hospitalizations for psychiatric illness. no illicit drug use. states she feels very comfortable following up with psychiatry outpatient. not on any meds for anxiety. patient endorses that her anxiety is situational, reports that she "only felt this way when she got a divorce, she is in general a very positive person". has had workup for syncope/near syncope this year.

## 2020-09-09 NOTE — ED ADULT NURSE NOTE - OBJECTIVE STATEMENT
44 year old female , resides by herself and shares custody of two children 11 and 9. Today, pt went to an urgent care clinic and suffered a panic attack while there and 911 was called. 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 Reports lost of appetite for the past two days and disturbed sleep pattern. Pt denied suicidal and homicidal ideations, denied illicit drug and drinks alcohol occasionally ( last was at a BBQ this past weekend). Pt PMHx of hypertension.

## 2020-09-09 NOTE — ED PROVIDER NOTE - CLINICAL SUMMARY MEDICAL DECISION MAKING FREE TEXT BOX
Dr. Vernon (Attending Physician)  Pt. with ho anxiety, htn, etoh abuse, vasovagal syncope pw stress about changes at work where she is an  having to return in covid, today developed anxiety, lightheadedness, palpitations, hand tingling bc of stress about getting covid results back in time. ECG with nsr prolonged qt but poor baseline advised to dw pmd. Not clinically intoxicated and states last drink 2 days ago. Otherwise normal neuro exam.

## 2020-09-09 NOTE — ED PROVIDER NOTE - ATTENDING CONTRIBUTION TO CARE
Dr. Vernon (Attending Physician)  I performed a history and physical exam of the patient and discussed their management with the advanced care provider. I reviewed the advanced care provider's note and agree with the documented findings and plan of care. My medical decision making and objective findings are found above.

## 2020-09-09 NOTE — ED PROVIDER NOTE - NSFOLLOWUPCLINICS_GEN_ALL_ED_FT
[facility]  Psychiatry  [address] [place] [address]  Phone: [phone]  Fax:   Follow Up Time:

## 2020-09-09 NOTE — ED PROVIDER NOTE - PATIENT PORTAL LINK FT
You can access the FollowMyHealth Patient Portal offered by [facility] by registering at the following website: [email or web address]. By joining [facility]’s FollowMyHealth portal, you will also be able to view your health information using other applications (apps) compatible with our system.

## 2020-09-09 NOTE — ED ADULT TRIAGE NOTE - CHIEF COMPLAINT QUOTE
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 Pt c/o feeling anxious. Denies SI/HI/depression PMH HTN

## 2021-06-06 NOTE — ED ADULT NURSE NOTE - NSFALLRSKUNASSIST_ED_ALL_ED
1. Take the prednisone as directed.     2. Take the antibiotic as directed.     3. Use the cough syrup as needed.     4. Try temazepam 15 mg at bedtime as needed, can use two at bedtime as needed.   
no

## 2021-06-07 NOTE — ED ADULT NURSE NOTE - MODE OF DISCHARGE
Quality 226: Preventive Care And Screening: Tobacco Use: Screening And Cessation Intervention: Patient screened for tobacco use and is an ex/non-smoker Quality 130: Documentation Of Current Medications In The Medical Record: Current Medications Documented Quality 431: Preventive Care And Screening: Unhealthy Alcohol Use - Screening: Patient screened for unhealthy alcohol use using a single question and scores less than 2 times per year Detail Level: Detailed Ambulatory

## 2021-12-06 NOTE — ED ADULT NURSE NOTE - CAS DISCH ACCOMP BY
Eat healthy foods you enjoy. Apixaban/Eliquis DOES NOT have a special diet. Limit your alcohol intake.
Self

## 2023-10-10 ENCOUNTER — EMERGENCY (EMERGENCY)
Facility: HOSPITAL | Age: 48
LOS: 1 days | Discharge: HOME | End: 2023-10-10
Attending: EMERGENCY MEDICINE
Payer: COMMERCIAL

## 2023-10-10 VITALS
HEIGHT: 66 IN | TEMPERATURE: 98 F | DIASTOLIC BLOOD PRESSURE: 85 MMHG | RESPIRATION RATE: 20 BRPM | SYSTOLIC BLOOD PRESSURE: 152 MMHG | HEART RATE: 85 BPM | WEIGHT: 164.91 LBS | OXYGEN SATURATION: 100 %

## 2023-10-10 VITALS
HEART RATE: 83 BPM | OXYGEN SATURATION: 100 % | SYSTOLIC BLOOD PRESSURE: 144 MMHG | TEMPERATURE: 97 F | RESPIRATION RATE: 19 BRPM | DIASTOLIC BLOOD PRESSURE: 82 MMHG

## 2023-10-10 PROCEDURE — 76376 3D RENDER W/INTRP POSTPROCES: CPT

## 2023-10-10 PROCEDURE — 99284 EMERGENCY DEPT VISIT MOD MDM: CPT

## 2023-10-10 PROCEDURE — 70486 CT MAXILLOFACIAL W/O DYE: CPT | Mod: 26,MA

## 2023-10-10 PROCEDURE — 99284 EMERGENCY DEPT VISIT MOD MDM: CPT | Mod: 25

## 2023-10-10 PROCEDURE — 76376 3D RENDER W/INTRP POSTPROCES: CPT | Mod: 26

## 2023-10-10 PROCEDURE — 70486 CT MAXILLOFACIAL W/O DYE: CPT | Mod: MA

## 2023-10-10 PROCEDURE — 70450 CT HEAD/BRAIN W/O DYE: CPT | Mod: MA

## 2023-10-10 PROCEDURE — 70450 CT HEAD/BRAIN W/O DYE: CPT | Mod: 26,MA

## 2023-10-10 RX ORDER — ALPRAZOLAM 0.25 MG
0.25 TABLET ORAL ONCE
Refills: 0 | Status: DISCONTINUED | OUTPATIENT
Start: 2023-10-10 | End: 2023-10-10

## 2023-10-10 RX ORDER — ACETAMINOPHEN 500 MG
975 TABLET ORAL ONCE
Refills: 0 | Status: COMPLETED | OUTPATIENT
Start: 2023-10-10 | End: 2023-10-10

## 2023-10-10 RX ADMIN — Medication 0.25 MILLIGRAM(S): at 21:28

## 2023-10-10 RX ADMIN — Medication 975 MILLIGRAM(S): at 16:00

## 2023-10-10 RX ADMIN — Medication 0.25 MILLIGRAM(S): at 16:21
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
## 2023-10-10 NOTE — ED PROVIDER NOTE - NSPTACCESSSVCSAPPTDETAILS_ED_ALL_ED_FT
Near-complete opacities in the right maxillary sinus due to polyps and   retention cysts. Small left maxillary sinus polyps or retention cyst   noted.

## 2023-10-10 NOTE — ED PROVIDER NOTE - NSFOLLOWUPINSTRUCTIONS_ED_ALL_ED_FT
Please make sure to follow up with your primary care doctor within 1-2 days and with the ENT specialist.   Our ED referrals coordinator will call you with appointment details to see the specialist, if you do not hear from anyone please call [phone] for additional assistance.   Bring a copy of all of your results with you to your follow up appointments.   Return to the ER as discussed if you develop any new or worsening symptoms.    You may take Tylenol 1000mg and Ibuprofen 400mg every 6 hours as needed for pain. Take Ibuprofen with food.

## 2023-10-10 NOTE — ED PROVIDER NOTE - PATIENT PORTAL LINK FT
You can access the FollowMyHealth Patient Portal offered by [facility] by registering at the following website: [email or web address]. By joining [facility]’s FollowMyHealth portal, you will also be able to view your health information using other applications (apps) compatible with our system.

## 2023-10-10 NOTE — ED PROVIDER NOTE - PHYSICAL EXAMINATION
CONSTITUTIONAL: In no apparent distress.  ENT: Airway patent, moist mucous membranes.   EYES: Pupils equal, round and reactive to light. EOMI. Conjunctiva normal appearing. Periorbital erythema to left eye. No periorbital TTP or swelling. No pain with eye movement. No discharge noted. No ocular injection or hyphema.   HEAD: No facial TTP. No midline cspine TTP.   CARDIAC: Normal rate, regular rhythm.  Heart sounds S1, S2.    RESPIRATORY: Breath sounds clear and equal bilaterally.   GASTROINTESTINAL: Abdomen soft, non-tender, not distended.  MUSCULOSKELETAL: Spine appears normal.  NEUROLOGICAL: Alert and oriented x3, no focal deficits, no motor or sensory deficits. 5/5 muscle strength throughout.  SKIN: Skin normal color, warm, dry and intact.   PSYCHIATRIC: Anxious. No SI/HI.
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
## 2023-10-10 NOTE — ED PROVIDER NOTE - NSDCPRINTRESULTS_ED_ALL_ED
details…
Patient requests all Lab, Cardiology, and Radiology Results on their Discharge Instructions
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
## 2023-10-10 NOTE — ED ADULT NURSE NOTE - NSFALLUNIVINTERV_ED_ALL_ED
Bed/Stretcher in lowest position, wheels locked, appropriate side rails in place/Call bell, personal items and telephone in reach/Instruct patient to call for assistance before getting out of bed/chair/stretcher/Non-slip footwear applied when patient is off stretcher/[place] to call system/Physically safe environment - no spills, clutter or unnecessary equipment/Purposeful proactive rounding/Room/bathroom lighting operational, light cord in reach

## 2023-10-10 NOTE — ED PROVIDER NOTE - NS ED ATTENDING STATEMENT MOD
This was a shared visit with the AKBAR. I reviewed and verified the documentation and independently performed the documented:
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
## 2024-12-24 NOTE — ED ADULT NURSE NOTE - IS THE PATIENT ABLE TO BE SCREENED?
The patient is Stable - Low risk of patient condition declining or worsening    Shift Goals  Clinical Goals: safety, admit to unit, pain control  Patient Goals: pain control. rest  Family Goals: jerry    Progress made toward(s) clinical / shift goals:  Patient admitted to unit. Bed alarm in place for patient safety. Patient ambulated in room. Dinner provided. Pain controlled with rest and re postioning.       Problem: Pain - Standard  Goal: Alleviation of pain or a reduction in pain to the patient’s comfort goal  Outcome: Progressing     Problem: Fall Risk  Goal: Patient will remain free from falls  Outcome: Progressing     Problem: Knowledge Deficit - Standard  Goal: Patient and family/care givers will demonstrate understanding of plan of care, disease process/condition, diagnostic tests and medications  Outcome: Progressing          Yes

## 2025-01-31 NOTE — ED PROVIDER NOTE - CARE PLAN
01/31/25 1323   Readmission Assessment   Number of Days since last admission? 8-30 days   Previous Disposition Other (comment)  (home with friend/caregiver)   Who is being Interviewed Patient   What was the patient's/caregiver's perception as to why they think they needed to return back to the hospital? Did not realize care needs would be so extensive   Did you visit your Primary Care Physician after you left the hospital, before you returned this time? No   Why weren't you able to visit your PCP? Other (Comment)  (no pcp)   Did you see a specialist, such as Cardiac, Pulmonary, Orthopedic Physician, etc. after you left the hospital? No   Who advised the patient to return to the hospital? Self-referral   Does the patient report anything that got in the way of taking their medications? No   In our efforts to provide the best possible care to you and others like you, can you think of anything that we could have done to help you after you left the hospital the first time, so that you might not have needed to return so soon? Discharge instructions that are concise, clear, and non contradictory        Principal Discharge DX:	Anxiety Principal Discharge DX:	Anxiety  Secondary Diagnosis:	Near syncope